# Patient Record
Sex: MALE | URBAN - METROPOLITAN AREA
[De-identification: names, ages, dates, MRNs, and addresses within clinical notes are randomized per-mention and may not be internally consistent; named-entity substitution may affect disease eponyms.]

---

## 2022-05-19 ENCOUNTER — LAB REQUISITION (OUTPATIENT)
Dept: LAB | Facility: CLINIC | Age: 62
End: 2022-05-19

## 2022-05-19 PROCEDURE — 88342 IMHCHEM/IMCYTCHM 1ST ANTB: CPT | Mod: TC | Performed by: PATHOLOGY

## 2022-05-19 PROCEDURE — 88313 SPECIAL STAINS GROUP 2: CPT | Mod: TC | Performed by: PATHOLOGY

## 2022-06-01 LAB
PATH REPORT.COMMENTS IMP SPEC: ABNORMAL
PATH REPORT.COMMENTS IMP SPEC: YES
PATH REPORT.FINAL DX SPEC: ABNORMAL
PATH REPORT.GROSS SPEC: ABNORMAL
PATH REPORT.MICROSCOPIC SPEC OTHER STN: ABNORMAL
PATH REPORT.MICROSCOPIC SPEC OTHER STN: ABNORMAL
PATH REPORT.RELEVANT HX SPEC: ABNORMAL
PATH REPORT.RELEVANT HX SPEC: ABNORMAL
PATH REPORT.SITE OF ORIGIN SPEC: ABNORMAL

## 2022-09-22 ENCOUNTER — MEDICAL CORRESPONDENCE (OUTPATIENT)
Dept: HEALTH INFORMATION MANAGEMENT | Facility: CLINIC | Age: 62
End: 2022-09-22

## 2022-10-26 ENCOUNTER — TELEPHONE (OUTPATIENT)
Dept: NEUROPSYCHOLOGY | Facility: CLINIC | Age: 62
End: 2022-10-26

## 2022-10-26 NOTE — TELEPHONE ENCOUNTER
AROLDO for Renettaamanda Wang [phone] to schedule next MATY slot w/ Dr. Jay Natarajan for neuropsych. Referral is faxed   10/26 GH

## 2022-11-02 ENCOUNTER — TRANSCRIBE ORDERS (OUTPATIENT)
Dept: OTHER | Age: 62
End: 2022-11-02

## 2022-11-02 DIAGNOSIS — F89 DEVELOPMENTAL DISABILITY: Primary | ICD-10-CM

## 2022-11-02 DIAGNOSIS — Z00.8 ENCOUNTER FOR PSYCHOLOGICAL EVALUATION: ICD-10-CM

## 2023-01-03 ENCOUNTER — OFFICE VISIT (OUTPATIENT)
Dept: NEUROPSYCHOLOGY | Facility: CLINIC | Age: 63
End: 2023-01-03
Payer: MEDICARE

## 2023-01-03 DIAGNOSIS — F71 MODERATE INTELLECTUAL DISABILITIES: Primary | ICD-10-CM

## 2023-01-03 PROCEDURE — 90791 PSYCH DIAGNOSTIC EVALUATION: CPT | Performed by: CLINICAL NEUROPSYCHOLOGIST

## 2023-01-03 PROCEDURE — 96132 NRPSYC TST EVAL PHYS/QHP 1ST: CPT | Performed by: CLINICAL NEUROPSYCHOLOGIST

## 2023-01-03 PROCEDURE — 96139 PSYCL/NRPSYC TST TECH EA: CPT | Performed by: CLINICAL NEUROPSYCHOLOGIST

## 2023-01-03 PROCEDURE — 96138 PSYCL/NRPSYC TECH 1ST: CPT | Performed by: CLINICAL NEUROPSYCHOLOGIST

## 2023-01-03 NOTE — LETTER
1/3/2023       RE: Seb Ramos  [address]  [place] [address]     Dear Colleague,    Thank you for referring your patient, Seb Ramos, to the [facility]UR[facility]OLOG[facility] [place] at [facility]. Please see a copy of my visit note below.    [facility] - [place] [address]     NEUROPSYCHOLOGICAL EVALUATION    RELEVANT HISTORY AND REASON FOR REFERRAL     This is a report of neuropsychological consultation regarding Seb Ramos, a 62-year-old, left-handed man. His medical history includes intellectual disability, nonalcoholic fatty liver disease, hepatocellular carcinoma, essential hypertension, constipation, obesity, and obsessive-compulsive disorder. He is on a Developmental Disability waver through [facility] with a service agreement end date of 2022. The current updated neuropsychological evaluation of brain functioning was requested by Jyothi Jacome, a Client  at [facility], as part of his updated eligibility documentation requirements for the Developmental Disability Waiver program.    Mr. Ramos was accompanied to today s appointment by Renetta Wang, a staff member at his group home with whom he has known for several years. Renetta provided significant additional information during the clinical interview.    Mr. Ramos and Renetta were not aware of any roxy-carl trauma associated with Mr. Ramos s birth. They did not know whether he met developmental milestones on time. He acknowledged a history of special education throughout his education. He denied being held back. Renetta noted that he can identify some letters, words, and his name while reading. She stated that he can write his name, but she has otherwise not observed his writing ability. Mr. Ramos stated that he graduated from high school in . Renetta was unsure if he participated in a  transitional program following high school. However, she noted that his reported high school graduation in 1981 is consistent with participation in a transition program, as he would have been 20 years old at that time.    Mr. Ramos is not  and has no children. Renetta reported that he lives in an apartment that is shared with a roommate and is managed by [facility] She estimated that he has lived in the group home since the 1980s or 1990s. She stated that his sister has been his legal guardian for some time. [facility] is his representative payee. Staff at his group home fill his pillbox weekly. Renetta stated that he takes his medications daily without error as it is part of his routine. She reported that his meals are prepared and delivered by [employer], but he warms the meals in the microwave. She stated that he is responsible for keeping his room tidy and he completes his own laundry. There were reportedly concerns about the setting he used on the washing machine in the past as the soap was not being dispersed during the cycle. It was also noted that he would wash one item of clothing at a time. However, with training, he has learned to complete his laundry successfully. He stated that he sometimes takes out the trash. He manages his personal cares independently. There have been concerns about the rate at which he uses laundry detergent and personal items, such as deodorant and shampoo. These items are now dispensed to him at regular intervals to ensure there is not overuse of these items. He reported that he has never held a  s license or learned to drive.    Renetta indicated that, to her knowledge, Mr. Ramos has subsisted on Social Security Disability Insurance throughout his life. She noted that he has also worked in a supportive employment capacity for many years. 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 However, she noted that there was a push for him to  work in a more  typical setting  in 2018. 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  Renetta indicated that he has performed several tasks with the company, but applying the stickers is his favorite job. His work continues to be supported in nature and overseen by [employer].     Mr. Ramos reported no history of stroke, seizure, head injury with loss of consciousness, or regular headaches or migraines. Renetta stated that he used to have occasional balance difficulties, which she attributed to his history of obesity. She indicated that since he has lost weight, his gait has been more stable. She reported no known history of falls. She described his cognitive status as stable. He reported no changes in his senses of smell or taste. Renetta described his hearing and vision as stable.    Renetta stated that Mr. Ramos tested positive for COVID-19 approximately 1 month ago. His symptoms were reportedly cold-like, including losing his voice and fever. He was diagnosed at Urgent Care and treated with Paxlovid. He was not hospitalized. He reported no persisting COVID-19 symptomatology.    Renetta acknowledged his recent embolization procedure for his hepatocellular carcinoma. She also noted that he experienced a knee fracture from an unknown origin a few years ago which caused him pain. She noted that he was able to follow the treatment recommendations appropriately, to stay off of his leg until the fracture healed, with no persisting knee problems. She reported no known problems with pain currently. She reported no known history of major surgeries.    Mr. Ramos described his sleep as  good.  He has no known history of sleep apnea. He stated that he feels rested when he wakes. Renetta reported no knowledge of dream enactment behaviors. She stated that while there is no overnight staff at his  group home, she was aware of reports that Mr. Ramos is often up and about early in the morning. He stated that he sometimes takes naps.     Overall, Mr. Ramos described his daytime energy as good. Renetta noted that he loves to eat, and he would eat anything placed in front of him. She denied observing a change in his appetite. She stated that he has gradually lost approximately 75 pounds since the beginning of the COVID-19 pandemic through a more structured diet with [employer] without additional temptations of outside foods.    Renetta acknowledged his prior diagnosis of obsessive-compulsive disorder. She noted that he is very consistent in his routine and often is concerned that others will forget the planned routine for the day (e.g., the day he receives more laundry detergent or shampoo). She noted that he has a place for everything in his home and notices if an object was moved. She stated that, if possible, he will rearrange items back to the original location. She reported that he showers twice daily. She denied observing excessive hand washing or counting behaviors. She reported additional worries about the weather and forecast. She did not believe his worries impacted his sleep or led to other physical symptoms. Overall, Mr. Ramos described his mood as  good.  Renetta reported no known history of suicidal ideation, suicide attempt, non-suicidal self-injury, hallucination, psychiatric hospitalization, or personality changes.    Mr. Ramos reported no use of alcohol, tobacco, or illicit substances. Renetta reported no known history of problematic substance use.    Renetta stated that Mr. Ramos s parents passed away in 2019. As noted above, he has a sister who is his legal guardian. She reportedly lives in [place]. He also has a brother who lives in [place]. He has a few nieces and nephews who live in the area who he sees for special occasions. Renetta reported no known family history of learning  trouble or cognitive disorders.     Mr. Ramos s current medication list includes metoprolol succinate, lisinopril, omeprazole, and polyethylene glycol.     BEHAVIORAL OBSERVATIONS     Mr. Ramos was engaged and cooperative throughout the evaluation. No gait, tremor, or postural abnormalities were observed. Mood and affect were congruent and euthymic. Speech was agrammatic, telegraphic, disarticulate, and slurred at times. He was asked to repeat himself at times due to mumbling his words. Comprehension appeared limited. Despite indicating that he understood task instruction, he often had difficulty following instructions. He often needed directions repeated and rephrased more concretely. He looked to the staff member from his group home to provide nearly all details regarding his background and recent history. Thought processes were concrete, tangential, and somewhat perseverative. He was easily redirected. His task approach was somewhat impulsive to start tasks, and he often needed encouragement to persist with tasks. Despite this, he appeared to put forth sufficient effort and persisted with requested procedures. The test results are seen as valid estimates of his cognitive capacity.      MEASURES ADMINISTERED     The following measures were administered by a trained psychometrist, under my supervision:     Orientation Testing: Time, Place, Personal Information, Presidents; Wide Range Achievement Test 4: Word Reading; Wechsler Adult Intelligence Scale - IV: Block Design, Similarities, Digit Span, Matrix Reasoning, Vocabulary, Arithmetic, Symbol Search, Visual Puzzles, Information, Coding; Animal Fluency; [place] Naming Test; Clock Drawing; Test of Sustained Attention & Tracking; Costa Verbal Learning Test; Brief Visuospatial Memory Test-Revised; PHQ-9; [place] Adaptive Behavior Scale (Domain-Level Parent/Caregiver Ratings).     RESULTS AND INTERPRETATION     Orientation: Orientation to time, place, basic  personal information, and basic cultural information was below expectations. He stated the date as Tuesday, September 3rd, 1973 (actual date was Tuesday, January 3rd, 2023). He was able to name the city he was in, but not the name of the building. He correctly stated the name of the city in which he resided. However, he did not know his address. He stated his age as 15 (actual age is 62). He correctly stated his birth month and year but stated that he was born on Halloween (actual YOB: 1960). He was not able to state the name of the current  president or any past presidents.    Intellectual Functioning: Full-scale intellectual functioning was measured in the exceptionally low range (FSIQ = 48). There was a relative strength on one non-motor measure of visual reasoning through picture organization (low average) relative to his performance across other measures of verbal and visual abstract reasoning, hand-on object assembly, general knowledge, processing speed, and working memory, which were all in the exceptionally low range.    Academic Skills: Performance on a reading and pronunciation test was in the exceptionally low range and suggested reading abilities in below a  level. He was able to name 4 individual letters correctly and read two words accurately. He was only able to accurately complete 2 of 3 picture counting tasks. He was not able to accurately complete any addition- or subtraction-based word problems, even when accompanied by pictures. He was not able to complete a serial subtraction task. Answers to academically based knowledge questions were exceptionally low. On a variety of brief verbal tasks, he was not able to recite the alphabet nor the months of the year in order. He was able to state the days of the week in order. However, he was not able to recite the days of the week in reverse order.     Language & Related Skills: As noted, basic reading and pronunciation  skills were exceptionally low. Demonstrating vocabulary knowledge was exceptionally low. Verbal abstract reasoning was exceptionally low. Category based verbal fluency was exceptionally low. Confrontation naming was exceptionally low. He often could describe the item pictured appropriately but did not know the name or appeared to know the name but had difficulty with pronunciation.      Visual Spatial & Constructional Skills: Visuospatial reasoning through hands-on object assembly was exceptionally low. Visual reasoning through pattern recognition was exceptionally low. Non-motor visuospatial reasoning through picture organization was low average.     Attention: Immediate auditory attention and working memory were exceptionally low for repeating and rearranging digit strings.    Learning & Anterograde Memory: Learning a word list over repeated readings was exceptionally low. He was not able to recall any of the words after a delay, which was exceptionally low. Delayed recognition of the list was below average with two false positive errors. Learning of simple geometric shapes and their spatial locations was exceptionally low. Free recall of the shapes was exceptionally low after a delay. Delayed recognition of the shapes was exceptionally low and significant for 4 false positive errors.     Mental Speed & Executive Functioning: As noted above, speeded verbal fluency was exceptionally low. He was not able to think flexibly or abstractly to identify similarities or patterns. His copy of a clock was exceptionally low, small, and notable for poor conceptualization, missing numbers, and poor organization of numbers within the clock face. His drawings of simple geometric shapes were loosely rendered and notable for significant perseverations. Performance on a speeded transcription task was exceptionally low. Speeded symbol matching was also exceptionally low.    Emotional, Behavioral, & Adaptive Functioning: On a brief  self-report inventory, he endorsed borderline symptoms of depression (PHQ-9 = 4). He noted that he has had little interest or pleasure in doing things more than half the days over the last two weeks including the day of this evaluation. He also indicated that he has trouble falling or staying asleep or sleeps too much and feels tired or has little energy several days over the last two weeks. He felt these symptoms were making his daily functioning somewhat difficult.    Renetta completed the [place]-3 questionnaire regarding Mr. Ramos s adaptive behavior. Her responses indicated that his overall level of adaptive functioning falls in the exceptionally low range (1st percentile). She indicated that his ability to listen, understand, express himself through speech, to read, and to write falls in the exceptionally low range (<1st percentile). Her responses indicated that his ability to complete age-appropriate daily tasks of living is exceptionally low (1st percentile). Additionally, his responses indicated that his functioning in social situations is also exceptionally low (1st percentile).    IMPRESSIONS AND RECOMMENDATIONS     The neuropsychological test results are abnormal. Mr. Ramos demonstrates global cognitive impairment across all assessed domains, including intellectual functioning, expressive language, visuospatial construction, attention, memory, processing speed, and executive functioning. His language skills are below that of a  level and his math skills are rudimentary, with some errors in basic counting observed. Similarly, ratings of his adaptive functioning suggest exceptionally low functioning across communication, socialization, and daily living skills domains.     Based on these findings, Mr. Ramos meets criteria for intellectual disability, overall in the moderate range. He will continue to require waivered services, assistance, and support across aspects of his daily care and  functioning, which at minimum should include guardianship, representative payee services, meal preparation services, oversight and management of his medication, oversight of his personal cares, and transportation. Given his limited capacity for complex and abstract concepts, such as time, he would likely benefit from a structured, predictable routine. Mr. Ramos should be accompanied to all medical appointments by a trusted family member, friend, or staff member to ensure accurate communication of clinical concerns and follow-through of prescribed treatments. Instructions should be given in plain, concrete language and repeated to ensure understanding. He would benefit from visual aids, demonstrations, and a period of step-by-step teaching to learn new skills.    He reported borderline symptoms of depression on the PHQ-9 and Renetta, a staff member at his group home, described his worries and OCD-type tendencies that are worthy of monitoring. Consultation with his prescribing physician regarding a trail of mood management medication may be appropriate if these symptoms increase and begin to interfere with his daily functioning. Mr. Ramos may also benefit from consultation with his treatment team regarding his sleep. to improve rejuvenating aspects of sleep and instances of daytime fatigue.    A neuropsychological baseline has been obtained. We would be happy to see him again, whenever clinically indicated.       Merlyn Lou, Ph.D.  Postdoctoral Fellow    Jay Natarajan, PhD, LP, ABPP-CN  Board Certified in Clinical Neuropsychology  Licensed Psychologist [id number]     All services provided by the Postdoctoral Fellow were supervised by this licensed psychologist and all billing noted here is for professional services provided by the psychologist and psychometrist.    Time spent: One unit psychiatric evaluation including records review, interview, and clinical assessment licensed and board-certified neuropsychologist  (CPT 03738). 59 minutes neuropsychological testing evaluation by licensed and board-certified neuropsychologist, including integration of patient data, interpretation of standardized test results and clinical data, clinical decision-making, treatment planning, supervision of the fellow, report writing, and interactive feedback to the patient (CPT 42976, 27038). 215 minutes of psychological and neuropsychological test administration and scoring by technician (CPT 81895, 39995). Diagnoses: [id number]

## 2023-01-03 NOTE — NURSING NOTE
Pt was seen for neuropsychological evaluation at the request of Jyothi Jacome, Client  for the purposes of diagnostic clarification and treatment planning. 215 minutes of test administration and scoring were provided by this writer. Please see Dr. Jay Natarajan's report for a full interpretation of the findings.    Kaylen Dover  Psychometrist

## 2023-01-06 NOTE — PROGRESS NOTES
[facility] [place] [address]     NEUROPSYCHOLOGICAL EVALUATION    RELEVANT HISTORY AND REASON FOR REFERRAL     This is a report of neuropsychological consultation regarding Seb Ramos, a 62-year-old, left-handed man. His medical history includes intellectual disability, nonalcoholic fatty liver disease, hepatocellular carcinoma, essential hypertension, constipation, obesity, and obsessive-compulsive disorder. He is on a Developmental Disability waver through [facility] with a service agreement end date of 2022. The current updated neuropsychological evaluation of brain functioning was requested by Jyothi Jacome, a Client  at [facility], as part of his updated eligibility documentation requirements for the Developmental Disability Waiver program.    Mr. Ramos was accompanied to today s appointment by Renetta Wang, a staff member at his group home with whom he has known for several years. Renetta provided significant additional information during the clinical interview.    Mr. Ramos and Renetta were not aware of any roxy- trauma associated with Mr. Ramos s birth. They did not know whether he met developmental milestones on time. He acknowledged a history of special education throughout his education. He denied being held back. Renetta noted that he can identify some letters, words, and his name while reading. She stated that he can write his name, but she has otherwise not observed his writing ability. Mr. Ramos stated that he graduated from high school in . Renetta was unsure if he participated in a transitional program following high school. However, she noted that his reported high school graduation in  is consistent with participation in a transition program, as he would have been 20 years old at that time.    Mr. Ramos is not  and has no children. Renetta reported that he lives in an apartment that is shared  with a roommate and is managed by [facility] ([facility]). She estimated that he has lived in the group home since the 1980s or 1990s. She stated that his sister has been his legal guardian for some time. [facility] is his representative payee. Staff at his group home fill his pillbox weekly. Renetta stated that he takes his medications daily without error as it is part of his routine. She reported that his meals are prepared and delivered by [employer], but he warms the meals in the microwave. She stated that he is responsible for keeping his room tidy and he completes his own laundry. There were reportedly concerns about the setting he used on the washing machine in the past as the soap was not being dispersed during the cycle. It was also noted that he would wash one item of clothing at a time. However, with training, he has learned to complete his laundry successfully. He stated that he sometimes takes out the trash. He manages his personal cares independently. There have been concerns about the rate at which he uses laundry detergent and personal items, such as deodorant and shampoo. These items are now dispensed to him at regular intervals to ensure there is not overuse of these items. He reported that he has never held a  s license or learned to drive.    Renetta indicated that, to her knowledge, Mr. Ramos has subsisted on Social Security Disability Insurance throughout his life. She noted that he has also worked in a supportive employment capacity for many years. 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 However, she noted that there was a push for him to work in a more  typical setting  in 2018. 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  Renetta indicated that he has performed several tasks with the company, but applying the stickers is his  favorite job. His work continues to be supported in nature and overseen by [employer].     Mr. Ramos reported no history of stroke, seizure, head injury with loss of consciousness, or regular headaches or migraines. Renetta stated that he used to have occasional balance difficulties, which she attributed to his history of obesity. She indicated that since he has lost weight, his gait has been more stable. She reported no known history of falls. She described his cognitive status as stable. He reported no changes in his senses of smell or taste. Renetta described his hearing and vision as stable.    Renetta stated that Mr. Ramos tested positive for COVID-19 approximately 1 month ago. His symptoms were reportedly cold-like, including losing his voice and fever. He was diagnosed at [facility]gent [facility] and treated with Paxlovid. He was not hospitalized. He reported no persisting COVID-19 symptomatology.    Renetta acknowledged his recent embolization procedure for his hepatocellular carcinoma. She also noted that he experienced a knee fracture from an unknown origin a few years ago which caused him pain. She noted that he was able to follow the treatment recommendations appropriately, to stay off of his leg until the fracture healed, with no persisting knee problems. She reported no known problems with pain currently. She reported no known history of major surgeries.    Mr. Ramos described his sleep as  good.  He has no known history of sleep apnea. He stated that he feels rested when he wakes. Renetta reported no knowledge of dream enactment behaviors. She stated that while there is no overnight staff at his group home, she was aware of reports that Mr. Ramos is often up and about early in the morning. He stated that he sometimes takes naps.     Overall, Mr. Ramos described his daytime energy as good. Renetta noted that he loves to eat, and he would eat anything placed in front of him. She denied observing a change in his appetite.  She stated that he has gradually lost approximately 75 pounds since the beginning of the COVID-19 pandemic through a more structured diet with [employer] without additional temptations of outside foods.    Renetta acknowledged his prior diagnosis of obsessive-compulsive disorder. She noted that he is very consistent in his routine and often is concerned that others will forget the planned routine for the day (e.g., the day he receives more laundry detergent or shampoo). She noted that he has a place for everything in his home and notices if an object was moved. She stated that, if possible, he will rearrange items back to the original location. She reported that he showers twice daily. She denied observing excessive hand washing or counting behaviors. She reported additional worries about the weather and forecast. She did not believe his worries impacted his sleep or led to other physical symptoms. Overall, Mr. Ramos described his mood as  good.  Renetta reported no known history of suicidal ideation, suicide attempt, non-suicidal self-injury, hallucination, psychiatric hospitalization, or personality changes.    Mr. Ramos reported no use of alcohol, tobacco, or illicit substances. Renetta reported no known history of problematic substance use.    Renetta stated that Mr. Ramos s parents passed away in 2019. As noted above, he has a sister who is his legal guardian. She reportedly lives in [place]. He also has a brother who lives in [place]. He has a few nieces and nephews who live in the area who he sees for special occasions. Renetta reported no known family history of learning trouble or cognitive disorders.     Mr. Ramos s current medication list includes metoprolol succinate, lisinopril, omeprazole, and polyethylene glycol.     BEHAVIORAL OBSERVATIONS     Mr. Ramos was engaged and cooperative throughout the evaluation. No gait, tremor, or postural abnormalities were observed. Mood and affect were  congruent and euthymic. Speech was agrammatic, telegraphic, disarticulate, and slurred at times. He was asked to repeat himself at times due to mumbling his words. Comprehension appeared limited. Despite indicating that he understood task instruction, he often had difficulty following instructions. He often needed directions repeated and rephrased more concretely. He looked to the staff member from his group home to provide nearly all details regarding his background and recent history. Thought processes were concrete, tangential, and somewhat perseverative. He was easily redirected. His task approach was somewhat impulsive to start tasks, and he often needed encouragement to persist with tasks. Despite this, he appeared to put forth sufficient effort and persisted with requested procedures. The test results are seen as valid estimates of his cognitive capacity.      MEASURES ADMINISTERED     The following measures were administered by a trained psychometrist, under my supervision:     Orientation Testing: Time, Place, Personal Information, Presidents; Wide Range Achievement Test 4: Word Reading; Wechsler Adult Intelligence Scale - IV: Block Design, Similarities, Digit Span, Matrix Reasoning, Vocabulary, Arithmetic, Symbol Search, Visual Puzzles, Information, Coding; Animal Fluency; [place] Naming Test; Clock Drawing; Test of Sustained Attention & Tracking; Costa Verbal Learning Test; Brief Visuospatial Memory Test-Revised; PHQ-9; [place] Adaptive Behavior Scale (Domain-Level Parent/Caregiver Ratings).     RESULTS AND INTERPRETATION     Orientation: Orientation to time, place, basic personal information, and basic cultural information was below expectations. He stated the date as Tuesday, [birth year], 1973 (actual date was Tuesday, January 3rd, 2023). He was able to name the city he was in, but not the name of the building. He correctly stated the name of the city in which he resided. However, he did not know  his address. He stated his age as 15 (actual age is 62). He correctly stated his birth month and year but stated that he was born on Halloween (actual YOB: 1960). He was not able to state the name of the current [place] president or any past presidents.    Intellectual Functioning: Full-scale intellectual functioning was measured in the exceptionally low range (FSIQ = 48). There was a relative strength on one non-motor measure of visual reasoning through picture organization (low average) relative to his performance across other measures of verbal and visual abstract reasoning, hand-on object assembly, general knowledge, processing speed, and working memory, which were all in the exceptionally low range.    Academic Skills: Performance on a reading and pronunciation test was in the exceptionally low range and suggested reading abilities in below a  level. He was able to name 4 individual letters correctly and read two words accurately. He was only able to accurately complete 2 of 3 picture counting tasks. He was not able to accurately complete any addition- or subtraction-based word problems, even when accompanied by pictures. He was not able to complete a serial subtraction task. Answers to academically based knowledge questions were exceptionally low. On a variety of brief verbal tasks, he was not able to recite the alphabet nor the months of the year in order. He was able to state the days of the week in order. However, he was not able to recite the days of the week in reverse order.     Language & Related Skills: As noted, basic reading and pronunciation skills were exceptionally low. Demonstrating vocabulary knowledge was exceptionally low. Verbal abstract reasoning was exceptionally low. Category based verbal fluency was exceptionally low. Confrontation naming was exceptionally low. He often could describe the item pictured appropriately but did not know the name or appeared to know the  name but had difficulty with pronunciation.      Visual Spatial & Constructional Skills: Visuospatial reasoning through hands-on object assembly was exceptionally low. Visual reasoning through pattern recognition was exceptionally low. Non-motor visuospatial reasoning through picture organization was low average.     Attention: Immediate auditory attention and working memory were exceptionally low for repeating and rearranging digit strings.    Learning & Anterograde Memory: Learning a word list over repeated readings was exceptionally low. He was not able to recall any of the words after a delay, which was exceptionally low. Delayed recognition of the list was below average with two false positive errors. Learning of simple geometric shapes and their spatial locations was exceptionally low. Free recall of the shapes was exceptionally low after a delay. Delayed recognition of the shapes was exceptionally low and significant for 4 false positive errors.     Mental Speed & Executive Functioning: As noted above, speeded verbal fluency was exceptionally low. He was not able to think flexibly or abstractly to identify similarities or patterns. His copy of a clock was exceptionally low, small, and notable for poor conceptualization, missing numbers, and poor organization of numbers within the clock face. His drawings of simple geometric shapes were loosely rendered and notable for significant perseverations. Performance on a speeded transcription task was exceptionally low. Speeded symbol matching was also exceptionally low.    Emotional, Behavioral, & Adaptive Functioning: On a brief self-report inventory, he endorsed borderline symptoms of depression (PHQ-9 = 4). He noted that he has had little interest or pleasure in doing things more than half the days over the last two weeks including the day of this evaluation. He also indicated that he has trouble falling or staying asleep or sleeps too much and feels tired or has  little energy several days over the last two weeks. He felt these symptoms were making his daily functioning somewhat difficult.    Renetta completed the [place]-3 questionnaire regarding Mr. Ramos s adaptive behavior. Her responses indicated that his overall level of adaptive functioning falls in the exceptionally low range (1st percentile). She indicated that his ability to listen, understand, express himself through speech, to read, and to write falls in the exceptionally low range (<1st percentile). Her responses indicated that his ability to complete age-appropriate daily tasks of living is exceptionally low (1st percentile). Additionally, his responses indicated that his functioning in social situations is also exceptionally low (1st percentile).    IMPRESSIONS AND RECOMMENDATIONS     The neuropsychological test results are abnormal. Mr. Ramos demonstrates global cognitive impairment across all assessed domains, including intellectual functioning, expressive language, visuospatial construction, attention, memory, processing speed, and executive functioning. His language skills are below that of a  level and his math skills are rudimentary, with some errors in basic counting observed. Similarly, ratings of his adaptive functioning suggest exceptionally low functioning across communication, socialization, and daily living skills domains.     Based on these findings, Mr. Ramos meets criteria for intellectual disability, overall in the moderate range. He will continue to require waivered services, assistance, and support across aspects of his daily care and functioning, which at minimum should include guardianship, representative payee services, meal preparation services, oversight and management of his medication, oversight of his personal cares, and transportation. Given his limited capacity for complex and abstract concepts, such as time, he would likely benefit from a structured, predictable  routine. Mr. Ramos should be accompanied to all medical appointments by a trusted family member, friend, or staff member to ensure accurate communication of clinical concerns and follow-through of prescribed treatments. Instructions should be given in plain, concrete language and repeated to ensure understanding. He would benefit from visual aids, demonstrations, and a period of step-by-step teaching to learn new skills.    He reported borderline symptoms of depression on the PHQ-9 and Renetta, a staff member at his group home, described his worries and OCD-type tendencies that are worthy of monitoring. Consultation with his prescribing physician regarding a trail of mood management medication may be appropriate if these symptoms increase and begin to interfere with his daily functioning. Mr. Ramos may also benefit from consultation with his treatment team regarding his sleep. to improve rejuvenating aspects of sleep and instances of daytime fatigue.    A neuropsychological baseline has been obtained. We would be happy to see him again, whenever clinically indicated.       Merlyn Lou, Ph.D.  Postdoctoral Fellow    Jay Natarajan, PhD, LP, ABPP-CN  Board Certified in Clinical Neuropsychology  Licensed Psychologist [id number]     All services provided by the Postdoctoral Fellow were supervised by this licensed psychologist and all billing noted here is for professional services provided by the psychologist and psychometrist.    Time spent: One unit psychiatric evaluation including records review, interview, and clinical assessment licensed and board-certified neuropsychologist (CPT 69671). 59 minutes neuropsychological testing evaluation by licensed and board-certified neuropsychologist, including integration of patient data, interpretation of standardized test results and clinical data, clinical decision-making, treatment planning, supervision of the fellow, report writing, and interactive feedback to the patient  (CPT 35528, 36044). 215 minutes of psychological and neuropsychological test administration and scoring by technician (CPT 49839, 07848). Diagnoses: [id number]

## 2023-01-06 NOTE — PROGRESS NOTES
NAME  Seb Ramos     MRN  [id number]      [birth year]     AGE  62     SEX  Male     HANDEDNESS Left     EDUCATION  12     MURILLO  1/3/23     PROVIDER  MAYA     [employer]  SW     STATION  OP            ORIENTATION      Time  -80     Place      Personal Info.     Presidents  0 /6                         WRAT   4     SS %ile GE   Word Reading 55 0.1 < K.0          WAIS-IV       FSIQ: 48 WMI: 50    VCI: 54 PSI: 53    RABIA: 60               Raw SS    Similarities  6 2    Vocabulary  9 3    Information  2 2    Block Design 6 2    Matrix Reasoning 0 1    Visual Puzzles 8 7    Digit Span  1 1    Arithmetic  3 1    Symbol Search 7 2    Coding  9 1           ANIMAL FLUENCY      Raw 8      SS 3      T 24              [place] NAMING TEST     Raw 33 /60     SS 2      %ile <1             TSAT        Total z     Time 427 -9.43     Errors 53 -20.28            CLOCK DRAWING      Command Abnormal              HVLT   1     Raw T    Trial 1  2     Trial 2  5     Trial 3  5     Learning  3     Total Recall  12 <20    Delayed Recall 0 <20    Percent Retention 0% <20    True Positives 10     False Positives 2     Disc. Index  8 31            BVMT   1     Raw T/%ile    Trial 1  1     Trial 2  0     Trial 3  1     Learning  0     Total Recall  2 <20    Delayed Recall 1 <20    Percent Retention 100% >16    Recognition Hits 6     Recognition F.P 4     Disc. Index  2 0           PHQ-9       Raw 4      Interp. Minimal